# Patient Record
Sex: MALE | Race: WHITE | NOT HISPANIC OR LATINO | Employment: FULL TIME | URBAN - METROPOLITAN AREA
[De-identification: names, ages, dates, MRNs, and addresses within clinical notes are randomized per-mention and may not be internally consistent; named-entity substitution may affect disease eponyms.]

---

## 2022-08-22 ENCOUNTER — OFFICE VISIT (OUTPATIENT)
Dept: FAMILY MEDICINE CLINIC | Facility: CLINIC | Age: 52
End: 2022-08-22

## 2022-08-22 VITALS
DIASTOLIC BLOOD PRESSURE: 86 MMHG | SYSTOLIC BLOOD PRESSURE: 122 MMHG | HEIGHT: 68 IN | WEIGHT: 179 LBS | HEART RATE: 77 BPM | BODY MASS INDEX: 27.13 KG/M2 | OXYGEN SATURATION: 97 %

## 2022-08-22 DIAGNOSIS — Z11.59 NEED FOR HEPATITIS C SCREENING TEST: ICD-10-CM

## 2022-08-22 DIAGNOSIS — Z23 NEED FOR VACCINATION: ICD-10-CM

## 2022-08-22 DIAGNOSIS — Z13.220 SCREENING FOR HYPERLIPIDEMIA: ICD-10-CM

## 2022-08-22 DIAGNOSIS — Z00.00 ANNUAL PHYSICAL EXAM: Primary | ICD-10-CM

## 2022-08-22 DIAGNOSIS — Z12.11 SCREEN FOR COLON CANCER: ICD-10-CM

## 2022-08-22 DIAGNOSIS — Z13.228 ENCOUNTER FOR SCREENING FOR OTHER METABOLIC DISORDERS: ICD-10-CM

## 2022-08-22 PROBLEM — E66.3 OVERWEIGHT WITH BODY MASS INDEX (BMI) OF 27 TO 27.9 IN ADULT: Status: ACTIVE | Noted: 2021-06-09

## 2022-08-22 PROCEDURE — 99386 PREV VISIT NEW AGE 40-64: CPT | Performed by: NURSE PRACTITIONER

## 2022-08-22 PROCEDURE — 0051A COVID-19 (PFIZER) 12+ YRS: CPT | Performed by: NURSE PRACTITIONER

## 2022-08-22 PROCEDURE — 91305 COVID-19 (PFIZER) 12+ YRS: CPT | Performed by: NURSE PRACTITIONER

## 2022-08-22 PROCEDURE — 90750 HZV VACC RECOMBINANT IM: CPT | Performed by: NURSE PRACTITIONER

## 2022-08-22 PROCEDURE — 90471 IMMUNIZATION ADMIN: CPT | Performed by: NURSE PRACTITIONER

## 2022-08-22 NOTE — PROGRESS NOTES
Preventive Exam    History of Present Illness: Prashant Bunch is a 52 y.o. here for check up and review of routine health maintenance. he states he is doing well and has no concerns. He is new to the office and new to me.     Past medical history, surgical history and family history have been reviewed.     Review of Systems    PHYSICAL EXAM    Vitals:    08/22/22 0854   BP: 122/86   Pulse: 77   SpO2: 97%   Body mass index is 27.22 kg/m².      Physical Exam  Vitals and nursing note reviewed.   Constitutional:       Appearance: Normal appearance. He is well-developed and normal weight.   HENT:      Head: Normocephalic and atraumatic.      Right Ear: Tympanic membrane, ear canal and external ear normal.      Left Ear: Tympanic membrane, ear canal and external ear normal.      Nose: Nose normal.      Mouth/Throat:      Lips: Pink.      Mouth: Mucous membranes are moist.      Tongue: No lesions.      Palate: No mass and lesions.      Pharynx: Oropharynx is clear. Uvula midline.      Tonsils: No tonsillar exudate.   Eyes:      Conjunctiva/sclera: Conjunctivae normal.      Pupils: Pupils are equal, round, and reactive to light.   Neck:      Thyroid: No thyromegaly.   Cardiovascular:      Rate and Rhythm: Normal rate and regular rhythm.      Pulses: Normal pulses.           Dorsalis pedis pulses are 2+ on the right side and 2+ on the left side.        Posterior tibial pulses are 2+ on the right side and 2+ on the left side.      Heart sounds: Normal heart sounds. No murmur heard.  Pulmonary:      Effort: Pulmonary effort is normal.      Breath sounds: Normal breath sounds.   Chest:   Breasts:      Right: No supraclavicular adenopathy.      Left: No supraclavicular adenopathy.       Abdominal:      General: Bowel sounds are normal. There is no distension.      Palpations: Abdomen is soft.      Tenderness: There is no abdominal tenderness.   Musculoskeletal:         General: No deformity. Normal range of motion.      Cervical  back: Normal range of motion and neck supple.      Right lower leg: No edema.      Left lower leg: No edema.   Lymphadenopathy:      Head:      Right side of head: No submental, submandibular, tonsillar, preauricular, posterior auricular or occipital adenopathy.      Left side of head: No submental, submandibular, tonsillar, preauricular, posterior auricular or occipital adenopathy.      Cervical: No cervical adenopathy.      Right cervical: No superficial, deep or posterior cervical adenopathy.     Left cervical: No superficial, deep or posterior cervical adenopathy.      Upper Body:      Right upper body: No supraclavicular adenopathy.      Left upper body: No supraclavicular adenopathy.   Skin:     General: Skin is warm and dry.      Capillary Refill: Capillary refill takes 2 to 3 seconds.   Neurological:      General: No focal deficit present.      Mental Status: He is alert and oriented to person, place, and time.      Cranial Nerves: Cranial nerves are intact. No cranial nerve deficit.      Sensory: Sensation is intact.      Motor: Motor function is intact.      Coordination: Coordination is intact.      Gait: Gait is intact.   Psychiatric:         Attention and Perception: Attention and perception normal.         Mood and Affect: Mood and affect normal.         Speech: Speech normal.         Behavior: Behavior normal. Behavior is cooperative.         Thought Content: Thought content normal.         Cognition and Memory: Cognition and memory normal.         Judgment: Judgment normal.         Procedures    Diagnoses and all orders for this visit:    1. Annual physical exam (Primary)    2. Screening for hyperlipidemia  -     Lipid Panel With / Chol / HDL Ratio    3. Need for hepatitis C screening test  -     Hepatitis C Antibody    4. Encounter for screening for other metabolic disorders  -     CBC & Differential  -     Comprehensive Metabolic Panel    5. Screen for colon cancer  -     Cologuard - Stool, Per  Rectum; Future    6. Need for vaccination  -     COVID-19 Vaccine (Pfizer) Gray Cap        Problems Addressed this Visit    None     Visit Diagnoses     Annual physical exam    -  Primary    Screening for hyperlipidemia        Relevant Orders    Lipid Panel With / Chol / HDL Ratio    Need for hepatitis C screening test        Relevant Orders    Hepatitis C Antibody    Encounter for screening for other metabolic disorders        Relevant Orders    CBC & Differential    Comprehensive Metabolic Panel    Screen for colon cancer        Relevant Orders    Cologuard - Stool, Per Rectum    Need for vaccination        Relevant Orders    COVID-19 Vaccine (Pfizer) Gray Cap      Diagnoses       Codes Comments    Annual physical exam    -  Primary ICD-10-CM: Z00.00  ICD-9-CM: V70.0     Screening for hyperlipidemia     ICD-10-CM: Z13.220  ICD-9-CM: V77.91     Need for hepatitis C screening test     ICD-10-CM: Z11.59  ICD-9-CM: V73.89     Encounter for screening for other metabolic disorders     ICD-10-CM: Z13.228  ICD-9-CM: V77.99     Screen for colon cancer     ICD-10-CM: Z12.11  ICD-9-CM: V76.51     Need for vaccination     ICD-10-CM: Z23  ICD-9-CM: V05.9         CBC  Lipid panel  CMP  Cologuard  Hepatitis C Screening    Routine health maintenance reviewed and discussed with Prashant Bunch.    Preventative counseling regarding healthy diet and exercise.   Pt reports that he wears a seatbelt regularly.  Return in about 1 year (around 8/22/2023) for Annual, Labs.

## 2022-08-22 NOTE — PATIENT INSTRUCTIONS
I will call you with your lab results.   Please call with any questions or concerns.    Return in about 1 year (around 2023) for Annual, Labs.    Annual Wellness  Personal Prevention Plan of Service     Date of Office Visit:    Encounter Provider:  SHIRA Morton  Place of Service:  [facility]  Patient Name: Prashant Bunch  :  1970    As part of the Annual Wellness portion of your visit today, we are providing you with this personalized preventive plan of services (PPPS). This plan is based upon recommendations of the [place] Preventive Services Task Force (USPSTF) and the Advisory Committee on Immunization Practices (ACIP).    This lists the preventive care services that should be considered, and provides dates of when you are due. Items listed as completed are up-to-date and do not require any further intervention.    Health Maintenance   Topic Date Due    COLORECTAL CANCER SCREENING  Never done    ZOSTER VACCINE (1 of 2) Never done    COVID-19 Vaccine (4 - Booster for Pfizer series) 2022    HEPATITIS C SCREENING  Never done    INFLUENZA VACCINE  10/01/2022    ANNUAL PHYSICAL  2023    TDAP/TD VACCINES (2 - Td or Tdap) 10/15/2030    Pneumococcal Vaccine 0-64  Aged Out       Orders Placed This Encounter   Procedures    COVID-19 Vaccine (Pfizer) Gray Cap    Cologuard - Stool, Per Rectum     Standing Status:   Future     Standing Expiration Date:   2023     Order Specific Question:   Release to patient     Answer:   Routine Release    Comprehensive Metabolic Panel     Order Specific Question:   Release to patient     Answer:   Routine Release    Lipid Panel With / Chol / HDL Ratio     Order Specific Question:   Release to patient     Answer:   Routine Release    Hepatitis C Antibody     Order Specific Question:   Release to patient     Answer:   Routine Release    CBC & Differential       Return in about 1 year (around 2023) for Annual,  Labs.

## 2022-08-23 LAB
ALBUMIN SERPL-MCNC: 4.8 G/DL (ref 4–5)
ALBUMIN/GLOB SERPL: 2.3 {RATIO} (ref 1.2–2.2)
ALP SERPL-CCNC: 63 IU/L (ref 44–121)
ALT SERPL-CCNC: 18 IU/L (ref 0–44)
AST SERPL-CCNC: 20 IU/L (ref 0–40)
BASOPHILS # BLD AUTO: 0 X10E3/UL (ref 0–0.2)
BASOPHILS NFR BLD AUTO: 1 %
BILIRUB SERPL-MCNC: 0.4 MG/DL (ref 0–1.2)
BUN SERPL-MCNC: 12 MG/DL (ref 6–24)
BUN/CREAT SERPL: 12 (ref 9–20)
CALCIUM SERPL-MCNC: 9.5 MG/DL (ref 8.7–10.2)
CHLORIDE SERPL-SCNC: 105 MMOL/L (ref 96–106)
CHOLEST SERPL-MCNC: 141 MG/DL (ref 100–199)
CHOLEST/HDLC SERPL: 1.9 RATIO (ref 0–5)
CO2 SERPL-SCNC: 23 MMOL/L (ref 20–29)
CREAT SERPL-MCNC: 0.99 MG/DL (ref 0.76–1.27)
EGFRCR-CYS SERPLBLD CKD-EPI 2021: 93 ML/MIN/1.73
EOSINOPHIL # BLD AUTO: 0.1 X10E3/UL (ref 0–0.4)
EOSINOPHIL NFR BLD AUTO: 3 %
ERYTHROCYTE [DISTWIDTH] IN BLOOD BY AUTOMATED COUNT: 11.8 % (ref 11.6–15.4)
GLOBULIN SER CALC-MCNC: 2.1 G/DL (ref 1.5–4.5)
GLUCOSE SERPL-MCNC: 96 MG/DL (ref 65–99)
HCT VFR BLD AUTO: 45 % (ref 37.5–51)
HCV AB S/CO SERPL IA: <0.1 S/CO RATIO (ref 0–0.9)
HDLC SERPL-MCNC: 73 MG/DL
HGB BLD-MCNC: 15.3 G/DL (ref 13–17.7)
IMM GRANULOCYTES # BLD AUTO: 0 X10E3/UL (ref 0–0.1)
IMM GRANULOCYTES NFR BLD AUTO: 0 %
LDLC SERPL CALC-MCNC: 55 MG/DL (ref 0–99)
LYMPHOCYTES # BLD AUTO: 1 X10E3/UL (ref 0.7–3.1)
LYMPHOCYTES NFR BLD AUTO: 21 %
MCH RBC QN AUTO: 32.6 PG (ref 26.6–33)
MCHC RBC AUTO-ENTMCNC: 34 G/DL (ref 31.5–35.7)
MCV RBC AUTO: 96 FL (ref 79–97)
MONOCYTES # BLD AUTO: 0.5 X10E3/UL (ref 0.1–0.9)
MONOCYTES NFR BLD AUTO: 9 %
NEUTROPHILS # BLD AUTO: 3.2 X10E3/UL (ref 1.4–7)
NEUTROPHILS NFR BLD AUTO: 66 %
PLATELET # BLD AUTO: 188 X10E3/UL (ref 150–450)
POTASSIUM SERPL-SCNC: 4.3 MMOL/L (ref 3.5–5.2)
PROT SERPL-MCNC: 6.9 G/DL (ref 6–8.5)
RBC # BLD AUTO: 4.7 X10E6/UL (ref 4.14–5.8)
SODIUM SERPL-SCNC: 142 MMOL/L (ref 134–144)
TRIGL SERPL-MCNC: 63 MG/DL (ref 0–149)
VLDLC SERPL CALC-MCNC: 13 MG/DL (ref 5–40)
WBC # BLD AUTO: 4.9 X10E3/UL (ref 3.4–10.8)

## 2022-08-26 ENCOUNTER — PATIENT ROUNDING (BHMG ONLY) (OUTPATIENT)
Dept: FAMILY MEDICINE CLINIC | Facility: CLINIC | Age: 52
End: 2022-08-26

## 2022-08-26 NOTE — PROGRESS NOTES
A [employer] message has been sent to the patient for PATIENT ROUNDING with [facility].

## 2022-09-26 DIAGNOSIS — R19.5 POSITIVE COLORECTAL CANCER SCREENING USING COLOGUARD TEST: Primary | ICD-10-CM

## 2022-09-28 DIAGNOSIS — R19.5 POSITIVE COLORECTAL CANCER SCREENING USING COLOGUARD TEST: Primary | ICD-10-CM

## 2022-10-27 VITALS
OXYGEN SATURATION: 98 % | SYSTOLIC BLOOD PRESSURE: 101 MMHG | SYSTOLIC BLOOD PRESSURE: 104 MMHG | DIASTOLIC BLOOD PRESSURE: 74 MMHG | DIASTOLIC BLOOD PRESSURE: 73 MMHG | HEART RATE: 75 BPM | OXYGEN SATURATION: 100 % | SYSTOLIC BLOOD PRESSURE: 121 MMHG | RESPIRATION RATE: 18 BRPM | DIASTOLIC BLOOD PRESSURE: 48 MMHG | HEART RATE: 65 BPM | TEMPERATURE: 98 F | RESPIRATION RATE: 17 BRPM | SYSTOLIC BLOOD PRESSURE: 115 MMHG | RESPIRATION RATE: 11 BRPM | HEART RATE: 71 BPM | DIASTOLIC BLOOD PRESSURE: 89 MMHG | DIASTOLIC BLOOD PRESSURE: 87 MMHG | RESPIRATION RATE: 20 BRPM | TEMPERATURE: 98.2 F | SYSTOLIC BLOOD PRESSURE: 125 MMHG | SYSTOLIC BLOOD PRESSURE: 102 MMHG | HEART RATE: 83 BPM | SYSTOLIC BLOOD PRESSURE: 99 MMHG | OXYGEN SATURATION: 97 % | HEART RATE: 66 BPM | RESPIRATION RATE: 8 BRPM | DIASTOLIC BLOOD PRESSURE: 72 MMHG | DIASTOLIC BLOOD PRESSURE: 82 MMHG | RESPIRATION RATE: 13 BRPM | SYSTOLIC BLOOD PRESSURE: 135 MMHG | RESPIRATION RATE: 16 BRPM | OXYGEN SATURATION: 99 % | DIASTOLIC BLOOD PRESSURE: 66 MMHG | HEART RATE: 68 BPM | SYSTOLIC BLOOD PRESSURE: 117 MMHG | RESPIRATION RATE: 21 BRPM | SYSTOLIC BLOOD PRESSURE: 140 MMHG | OXYGEN SATURATION: 94 % | SYSTOLIC BLOOD PRESSURE: 107 MMHG | OXYGEN SATURATION: 95 % | DIASTOLIC BLOOD PRESSURE: 65 MMHG | HEIGHT: 68 IN | HEART RATE: 63 BPM | DIASTOLIC BLOOD PRESSURE: 64 MMHG | OXYGEN SATURATION: 96 % | DIASTOLIC BLOOD PRESSURE: 70 MMHG | HEART RATE: 70 BPM | SYSTOLIC BLOOD PRESSURE: 113 MMHG | WEIGHT: 175 LBS

## 2022-10-31 ENCOUNTER — AMBULATORY SURGICAL CENTER (AMBULATORY)
Dept: URBAN - METROPOLITAN AREA SURGERY 17 | Facility: SURGERY | Age: 52
End: 2022-10-31
Payer: COMMERCIAL

## 2022-10-31 ENCOUNTER — OFFICE (AMBULATORY)
Dept: URBAN - METROPOLITAN AREA PATHOLOGY 4 | Facility: PATHOLOGY | Age: 52
End: 2022-10-31

## 2022-10-31 DIAGNOSIS — D12.8 BENIGN NEOPLASM OF RECTUM: ICD-10-CM

## 2022-10-31 DIAGNOSIS — K63.5 POLYP OF COLON: ICD-10-CM

## 2022-10-31 DIAGNOSIS — D12.4 BENIGN NEOPLASM OF DESCENDING COLON: ICD-10-CM

## 2022-10-31 DIAGNOSIS — K62.1 RECTAL POLYP: ICD-10-CM

## 2022-10-31 DIAGNOSIS — D12.2 BENIGN NEOPLASM OF ASCENDING COLON: ICD-10-CM

## 2022-10-31 DIAGNOSIS — D12.3 BENIGN NEOPLASM OF TRANSVERSE COLON: ICD-10-CM

## 2022-10-31 DIAGNOSIS — R19.5 OTHER FECAL ABNORMALITIES: ICD-10-CM

## 2022-10-31 DIAGNOSIS — Z12.11 ENCOUNTER FOR SCREENING FOR MALIGNANT NEOPLASM OF COLON: ICD-10-CM

## 2022-10-31 LAB
GI HISTOLOGY: A. UNSPECIFIED: (no result)
GI HISTOLOGY: B. UNSPECIFIED: (no result)
GI HISTOLOGY: C. UNSPECIFIED: (no result)
GI HISTOLOGY: D. UNSPECIFIED: (no result)
GI HISTOLOGY: PDF REPORT: (no result)

## 2022-10-31 PROCEDURE — 88305 TISSUE EXAM BY PATHOLOGIST: CPT | Performed by: INTERNAL MEDICINE

## 2022-10-31 PROCEDURE — 45385 COLONOSCOPY W/LESION REMOVAL: CPT | Mod: 33 | Performed by: INTERNAL MEDICINE

## 2022-11-16 ENCOUNTER — PRE-PROCEDURE SCREENING (OUTPATIENT)
Dept: GASTROENTEROLOGY | Facility: CLINIC | Age: 52
End: 2022-11-16

## 2022-11-16 NOTE — TELEPHONE ENCOUNTER
POSITIVE COLOGUARD      Colonoscopy screening--No personal history of polyps--No family history of polyps--Family history  colon cancer--No blood thinners--Medications:              None

## 2022-11-18 ENCOUNTER — PREP FOR SURGERY (OUTPATIENT)
Dept: OTHER | Facility: HOSPITAL | Age: 52
End: 2022-11-18

## 2022-11-18 DIAGNOSIS — R19.5 POSITIVE COLORECTAL CANCER SCREENING USING COLOGUARD TEST: Primary | ICD-10-CM

## 2022-11-18 RX ORDER — SODIUM CHLORIDE, SODIUM LACTATE, POTASSIUM CHLORIDE, CALCIUM CHLORIDE 600; 310; 30; 20 MG/100ML; MG/100ML; MG/100ML; MG/100ML
30 INJECTION, SOLUTION INTRAVENOUS CONTINUOUS
Status: CANCELLED | OUTPATIENT
Start: 2022-11-18

## 2022-12-09 ENCOUNTER — CLINICAL SUPPORT (OUTPATIENT)
Dept: FAMILY MEDICINE CLINIC | Facility: CLINIC | Age: 52
End: 2022-12-09

## 2022-12-09 DIAGNOSIS — Z23 NEED FOR VACCINATION: Primary | ICD-10-CM

## 2022-12-09 PROCEDURE — 91312 COVID-19 (PFIZER) BIVALENT BOOSTER 12+YRS: CPT | Performed by: NURSE PRACTITIONER

## 2022-12-09 PROCEDURE — 90686 IIV4 VACC NO PRSV 0.5 ML IM: CPT | Performed by: NURSE PRACTITIONER

## 2022-12-09 PROCEDURE — 0124A PR ADM SARSCOV2 30MCG/0.3ML BST: CPT | Performed by: NURSE PRACTITIONER

## 2022-12-09 PROCEDURE — 90471 IMMUNIZATION ADMIN: CPT | Performed by: NURSE PRACTITIONER

## 2022-12-12 ENCOUNTER — TELEPHONE (OUTPATIENT)
Dept: GASTROENTEROLOGY | Facility: CLINIC | Age: 52
End: 2022-12-12

## 2022-12-16 ENCOUNTER — CLINICAL SUPPORT (OUTPATIENT)
Dept: FAMILY MEDICINE CLINIC | Facility: CLINIC | Age: 52
End: 2022-12-16

## 2022-12-16 DIAGNOSIS — Z23 NEED FOR VACCINATION: Primary | ICD-10-CM

## 2022-12-16 PROCEDURE — 90750 HZV VACC RECOMBINANT IM: CPT | Performed by: NURSE PRACTITIONER

## 2023-08-28 ENCOUNTER — OFFICE VISIT (OUTPATIENT)
Dept: FAMILY MEDICINE CLINIC | Facility: CLINIC | Age: 53
End: 2023-08-28
Payer: COMMERCIAL

## 2023-08-28 VITALS
HEIGHT: 68 IN | SYSTOLIC BLOOD PRESSURE: 138 MMHG | HEART RATE: 66 BPM | BODY MASS INDEX: 27.28 KG/M2 | RESPIRATION RATE: 18 BRPM | WEIGHT: 180 LBS | DIASTOLIC BLOOD PRESSURE: 90 MMHG | OXYGEN SATURATION: 99 %

## 2023-08-28 DIAGNOSIS — Z13.228 ENCOUNTER FOR SCREENING FOR OTHER METABOLIC DISORDERS: ICD-10-CM

## 2023-08-28 DIAGNOSIS — Z00.00 ANNUAL PHYSICAL EXAM: Primary | ICD-10-CM

## 2023-08-28 DIAGNOSIS — Z12.5 SCREENING PSA (PROSTATE SPECIFIC ANTIGEN): ICD-10-CM

## 2023-08-28 DIAGNOSIS — Z13.220 SCREENING FOR HYPERLIPIDEMIA: ICD-10-CM

## 2023-08-28 LAB
ALBUMIN SERPL-MCNC: 4.8 G/DL (ref 3.5–5.2)
ALBUMIN/GLOB SERPL: 2.3 G/DL
ALP SERPL-CCNC: 63 U/L (ref 39–117)
ALT SERPL-CCNC: 19 U/L (ref 1–41)
AST SERPL-CCNC: 22 U/L (ref 1–40)
BILIRUB SERPL-MCNC: 0.7 MG/DL (ref 0–1.2)
BUN SERPL-MCNC: 12 MG/DL (ref 6–20)
BUN/CREAT SERPL: 11.4 (ref 7–25)
CALCIUM SERPL-MCNC: 9.3 MG/DL (ref 8.6–10.5)
CHLORIDE SERPL-SCNC: 104 MMOL/L (ref 98–107)
CHOLEST SERPL-MCNC: 145 MG/DL (ref 0–200)
CHOLEST/HDLC SERPL: 1.96 {RATIO}
CO2 SERPL-SCNC: 26.4 MMOL/L (ref 22–29)
CREAT SERPL-MCNC: 1.05 MG/DL (ref 0.76–1.27)
EGFRCR SERPLBLD CKD-EPI 2021: 85.9 ML/MIN/1.73
GLOBULIN SER CALC-MCNC: 2.1 GM/DL
GLUCOSE SERPL-MCNC: 94 MG/DL (ref 65–99)
HDLC SERPL-MCNC: 74 MG/DL (ref 40–60)
LDLC SERPL CALC-MCNC: 50 MG/DL (ref 0–100)
POTASSIUM SERPL-SCNC: 4.3 MMOL/L (ref 3.5–5.2)
PROT SERPL-MCNC: 6.9 G/DL (ref 6–8.5)
PSA SERPL-MCNC: 3.18 NG/ML (ref 0–4)
SODIUM SERPL-SCNC: 142 MMOL/L (ref 136–145)
TRIGL SERPL-MCNC: 121 MG/DL (ref 0–150)
VLDLC SERPL CALC-MCNC: 21 MG/DL (ref 5–40)

## 2023-08-28 PROCEDURE — 99396 PREV VISIT EST AGE 40-64: CPT | Performed by: NURSE PRACTITIONER

## 2023-08-28 RX ORDER — MULTIVIT WITH MINERALS/LUTEIN
250 TABLET ORAL DAILY
COMMUNITY

## 2023-08-28 RX ORDER — MULTIPLE VITAMINS W/ MINERALS TAB 9MG-400MCG
1 TAB ORAL DAILY
COMMUNITY

## 2023-08-28 NOTE — PATIENT INSTRUCTIONS
I will call call or send [employer] message with your lab results.   Please call with any questions or concerns.    Return in about 1 year (around 8/28/2024) for Annual, Labs.

## 2023-08-28 NOTE — PROGRESS NOTES
Preventive Exam    History of Present Illness: Prashant Bunch is a 53 y.o. here for check up and review of routine health maintenance. he states he is doing well and has no concerns.    Past medical history, surgical history and family history have been reviewed.     Review of Systems   Constitutional:  Negative for appetite change, chills, fatigue, fever, unexpected weight gain and unexpected weight loss.   HENT:  Positive for congestion (with allergies). Negative for dental problem, facial swelling, mouth sores, nosebleeds, sinus pressure, sore throat, trouble swallowing and voice change.    Eyes: Negative.  Negative for blurred vision, double vision, photophobia and visual disturbance.        Eye exam is up to date.    Respiratory:  Negative for cough, chest tightness, shortness of breath and wheezing.    Cardiovascular:  Negative for chest pain, palpitations and leg swelling.   Gastrointestinal:  Negative for abdominal pain, constipation, diarrhea, nausea and vomiting.   Endocrine: Negative.  Negative for cold intolerance and heat intolerance.   Genitourinary: Negative.  Negative for decreased libido, difficulty urinating, discharge, frequency, genital sores, penile swelling, scrotal swelling and urinary incontinence.   Musculoskeletal:  Negative for arthralgias, back pain, joint swelling and myalgias.   Skin: Negative.  Negative for dry skin.   Allergic/Immunologic: Negative.  Negative for environmental allergies and food allergies.   Neurological:  Negative for dizziness, speech difficulty, weakness, numbness and headache.   Hematological: Negative.  Does not bruise/bleed easily.   Psychiatric/Behavioral: Negative.  Negative for dysphoric mood, sleep disturbance, suicidal ideas and depressed mood. The patient is not nervous/anxious.      PHYSICAL EXAM    Vitals:    08/28/23 0948   BP: 138/90   Pulse: 66   Resp: 18   SpO2: 99%       Body mass index is 27.37 kg/mý.   BMI is >= 25 and <30. (Overweight) The  following options were offered after discussion;: exercise counseling/recommendations and nutrition counseling/recommendations       Physical Exam  Vitals and nursing note reviewed.   Constitutional:       Appearance: Normal appearance. He is well-developed and normal weight.   HENT:      Head: Normocephalic and atraumatic.      Right Ear: Tympanic membrane, ear canal and external ear normal.      Left Ear: Tympanic membrane, ear canal and external ear normal.      Nose: Nose normal.      Mouth/Throat:      Lips: Pink.      Mouth: Mucous membranes are moist.      Tongue: No lesions.      Palate: No mass and lesions.      Pharynx: Oropharynx is clear. Uvula midline.      Tonsils: No tonsillar exudate.   Eyes:      Conjunctiva/sclera: Conjunctivae normal.      Pupils: Pupils are equal, round, and reactive to light.   Neck:      Thyroid: No thyromegaly.   Cardiovascular:      Rate and Rhythm: Normal rate and regular rhythm.      Pulses: Normal pulses.           Dorsalis pedis pulses are 2+ on the right side and 2+ on the left side.        Posterior tibial pulses are 2+ on the right side and 2+ on the left side.      Heart sounds: Normal heart sounds. No murmur heard.  Pulmonary:      Effort: Pulmonary effort is normal.      Breath sounds: Normal breath sounds.   Abdominal:      General: Bowel sounds are normal. There is no distension.      Palpations: Abdomen is soft.      Tenderness: There is no abdominal tenderness.   Musculoskeletal:         General: No deformity. Normal range of motion.      Cervical back: Normal range of motion and neck supple.      Right lower leg: No edema.      Left lower leg: No edema.   Lymphadenopathy:      Head:      Right side of head: No submental, submandibular, tonsillar, preauricular, posterior auricular or occipital adenopathy.      Left side of head: No submental, submandibular, tonsillar, preauricular, posterior auricular or occipital adenopathy.      Cervical: No cervical  adenopathy.      Right cervical: No superficial, deep or posterior cervical adenopathy.     Left cervical: No superficial, deep or posterior cervical adenopathy.      Upper Body:      Right upper body: No supraclavicular adenopathy.      Left upper body: No supraclavicular adenopathy.   Skin:     General: Skin is warm and dry.      Capillary Refill: Capillary refill takes 2 to 3 seconds.   Neurological:      General: No focal deficit present.      Mental Status: He is alert and oriented to person, place, and time.      Cranial Nerves: No cranial nerve deficit.      Sensory: Sensation is intact.      Motor: Motor function is intact.      Coordination: Coordination is intact.      Gait: Gait is intact.   Psychiatric:         Attention and Perception: Attention and perception normal.         Mood and Affect: Mood and affect normal.         Speech: Speech normal.         Behavior: Behavior normal. Behavior is cooperative.         Thought Content: Thought content normal.         Cognition and Memory: Cognition and memory normal.         Judgment: Judgment normal.       Procedures    Diagnoses and all orders for this visit:    1. Annual physical exam (Primary)    2. Screening for hyperlipidemia  -     Lipid Panel With / Chol / HDL Ratio    3. Encounter for screening for other metabolic disorders  -     CBC & Differential  -     Comprehensive Metabolic Panel    4. Screening PSA (prostate specific antigen)  -     PSA Screen        Problems Addressed this Visit    None  Visit Diagnoses       Annual physical exam    -  Primary    Screening for hyperlipidemia        Relevant Orders    Lipid Panel With / Chol / HDL Ratio    Encounter for screening for other metabolic disorders        Relevant Orders    CBC & Differential    Comprehensive Metabolic Panel    Screening PSA (prostate specific antigen)        Relevant Orders    PSA Screen          Diagnoses         Codes Comments    Annual physical exam    -  Primary ICD-10-CM:  Z00.00  ICD-9-CM: V70.0     Screening for hyperlipidemia     ICD-10-CM: Z13.220  ICD-9-CM: V77.91     Encounter for screening for other metabolic disorders     ICD-10-CM: Z13.228  ICD-9-CM: V77.99     Screening PSA (prostate specific antigen)     ICD-10-CM: Z12.5  ICD-9-CM: V76.44           Lipid panel  CBC  CMP  PSA Screening  Routine health maintenance reviewed and discussed with Prashant Bunch.    Preventative counseling regarding healthy diet and exercise.   Pt reports that he wears a seatbelt regularly.   Return in about 1 year (around 8/28/2024) for Annual, Labs.

## 2023-08-29 LAB
BASOPHILS # BLD AUTO: 0.03 10*3/MM3 (ref 0–0.2)
BASOPHILS NFR BLD AUTO: 0.6 % (ref 0–1.5)
EOSINOPHIL # BLD AUTO: 0.1 10*3/MM3 (ref 0–0.4)
EOSINOPHIL NFR BLD AUTO: 2.1 % (ref 0.3–6.2)
ERYTHROCYTE [DISTWIDTH] IN BLOOD BY AUTOMATED COUNT: 11.8 % (ref 12.3–15.4)
HCT VFR BLD AUTO: 44.3 % (ref 37.5–51)
HGB BLD-MCNC: 15.6 G/DL (ref 13–17.7)
IMM GRANULOCYTES # BLD AUTO: 0.01 10*3/MM3 (ref 0–0.05)
IMM GRANULOCYTES NFR BLD AUTO: 0.2 % (ref 0–0.5)
LYMPHOCYTES # BLD AUTO: 0.98 10*3/MM3 (ref 0.7–3.1)
LYMPHOCYTES NFR BLD AUTO: 20.2 % (ref 19.6–45.3)
MCH RBC QN AUTO: 33.5 PG (ref 26.6–33)
MCHC RBC AUTO-ENTMCNC: 35.2 G/DL (ref 31.5–35.7)
MCV RBC AUTO: 95.1 FL (ref 79–97)
MONOCYTES # BLD AUTO: 0.43 10*3/MM3 (ref 0.1–0.9)
MONOCYTES NFR BLD AUTO: 8.9 % (ref 5–12)
NEUTROPHILS # BLD AUTO: 3.29 10*3/MM3 (ref 1.7–7)
NEUTROPHILS NFR BLD AUTO: 68 % (ref 42.7–76)
NRBC BLD AUTO-RTO: 0 /100 WBC (ref 0–0.2)
PLATELET # BLD AUTO: 194 10*3/MM3 (ref 140–450)
RBC # BLD AUTO: 4.66 10*6/MM3 (ref 4.14–5.8)
WBC # BLD AUTO: 4.84 10*3/MM3 (ref 3.4–10.8)

## 2024-08-29 ENCOUNTER — OFFICE VISIT (OUTPATIENT)
Dept: FAMILY MEDICINE CLINIC | Facility: CLINIC | Age: 54
End: 2024-08-29
Payer: COMMERCIAL

## 2024-08-29 VITALS
SYSTOLIC BLOOD PRESSURE: 108 MMHG | HEIGHT: 68 IN | OXYGEN SATURATION: 98 % | WEIGHT: 171 LBS | DIASTOLIC BLOOD PRESSURE: 80 MMHG | HEART RATE: 84 BPM | RESPIRATION RATE: 18 BRPM | BODY MASS INDEX: 25.91 KG/M2

## 2024-08-29 DIAGNOSIS — Z00.00 ANNUAL PHYSICAL EXAM: Primary | ICD-10-CM

## 2024-08-29 DIAGNOSIS — Z13.228 ENCOUNTER FOR SCREENING FOR OTHER METABOLIC DISORDERS: ICD-10-CM

## 2024-08-29 DIAGNOSIS — Z12.5 SCREENING PSA (PROSTATE SPECIFIC ANTIGEN): ICD-10-CM

## 2024-08-29 DIAGNOSIS — Z13.220 SCREENING FOR HYPERLIPIDEMIA: ICD-10-CM

## 2024-08-29 PROCEDURE — 99396 PREV VISIT EST AGE 40-64: CPT | Performed by: NURSE PRACTITIONER

## 2024-08-29 NOTE — PROGRESS NOTES
Preventive Exam    History of Present Illness: Prashant Bunch is a 54 y.o. here for check up and review of routine health maintenance. he states he is doing well and has no concerns.    Past medical history, surgical history and family history have been reviewed.     Review of Systems   Constitutional:  Negative for appetite change, chills, fatigue and fever.   HENT:  Positive for congestion. Negative for dental problem, sinus pressure and sore throat.         Dental exam is up to date.    Eyes: Negative.  Negative for blurred vision, double vision, photophobia and visual disturbance.        Wears glasses. Eye exam is up to date.    Respiratory:  Negative for cough, chest tightness and shortness of breath.    Cardiovascular:  Negative for chest pain, palpitations and leg swelling.   Gastrointestinal:  Negative for abdominal pain, constipation, diarrhea, nausea and vomiting.   Endocrine: Negative.  Negative for cold intolerance and heat intolerance.   Genitourinary: Negative.  Negative for decreased libido, decreased urine volume, difficulty urinating, flank pain, genital sores, hematuria, erectile dysfunction and testicular pain.   Musculoskeletal:  Negative for arthralgias, back pain, joint swelling and myalgias.   Skin: Negative.  Negative for pallor.   Allergic/Immunologic: Negative.  Negative for environmental allergies and food allergies.   Neurological:  Negative for dizziness, weakness, numbness and headache.   Hematological: Negative.  Does not bruise/bleed easily.   Psychiatric/Behavioral: Negative.  Negative for hallucinations, suicidal ideas and depressed mood. The patient is not nervous/anxious.        PHYSICAL EXAM    Vitals:    08/29/24 1542   BP: 108/80   Pulse: 84   Resp: 18   SpO2: 98%       Body mass index is 26 kg/m².   BMI is >= 25 and <30. (Overweight) The following options were offered after discussion;: exercise counseling/recommendations and nutrition counseling/recommendations       Physical  Exam  Vitals and nursing note reviewed.   Constitutional:       Appearance: Normal appearance. He is well-developed.   HENT:      Head: Normocephalic and atraumatic.      Right Ear: Tympanic membrane, ear canal and external ear normal.      Left Ear: Tympanic membrane, ear canal and external ear normal.      Nose: Nose normal.      Mouth/Throat:      Lips: Pink.      Mouth: Mucous membranes are moist.      Tongue: No lesions.      Palate: No mass and lesions.      Pharynx: Oropharynx is clear. Uvula midline.      Tonsils: No tonsillar exudate.   Eyes:      Conjunctiva/sclera: Conjunctivae normal.      Pupils: Pupils are equal, round, and reactive to light.   Neck:      Thyroid: No thyromegaly.   Cardiovascular:      Rate and Rhythm: Normal rate and regular rhythm.      Pulses: Normal pulses.           Dorsalis pedis pulses are 2+ on the right side and 2+ on the left side.        Posterior tibial pulses are 2+ on the right side and 2+ on the left side.      Heart sounds: Normal heart sounds. No murmur heard.  Pulmonary:      Effort: Pulmonary effort is normal.      Breath sounds: Normal breath sounds.   Abdominal:      General: Bowel sounds are normal. There is no distension.      Palpations: Abdomen is soft.      Tenderness: There is no abdominal tenderness.   Musculoskeletal:         General: No deformity. Normal range of motion.      Cervical back: Normal range of motion and neck supple.      Right lower leg: No edema.      Left lower leg: No edema.   Lymphadenopathy:      Head:      Right side of head: No submental, submandibular, tonsillar, preauricular, posterior auricular or occipital adenopathy.      Left side of head: No submental, submandibular, tonsillar, preauricular, posterior auricular or occipital adenopathy.      Cervical: No cervical adenopathy.      Right cervical: No superficial, deep or posterior cervical adenopathy.     Left cervical: No superficial, deep or posterior cervical adenopathy.       Upper Body:      Right upper body: No supraclavicular adenopathy.      Left upper body: No supraclavicular adenopathy.   Skin:     General: Skin is warm and dry.      Capillary Refill: Capillary refill takes 2 to 3 seconds.   Neurological:      General: No focal deficit present.      Mental Status: He is alert and oriented to person, place, and time.      Cranial Nerves: No cranial nerve deficit.      Sensory: Sensation is intact.      Motor: Motor function is intact.      Coordination: Coordination is intact.      Gait: Gait is intact.   Psychiatric:         Attention and Perception: Attention and perception normal.         Mood and Affect: Mood and affect normal.         Speech: Speech normal.         Behavior: Behavior normal. Behavior is cooperative.         Thought Content: Thought content normal.         Cognition and Memory: Cognition and memory normal.         Judgment: Judgment normal.         Procedures    Diagnoses and all orders for this visit:    1. Annual physical exam (Primary)    2. Encounter for screening for other metabolic disorders  -     CBC & Differential  -     Comprehensive Metabolic Panel    3. Screening for hyperlipidemia  -     Lipid Panel With / Chol / HDL Ratio    4. Screening PSA (prostate specific antigen)  -     PSA Screen        Problems Addressed this Visit    None  Visit Diagnoses       Annual physical exam    -  Primary    Encounter for screening for other metabolic disorders        Relevant Orders    CBC & Differential    Comprehensive Metabolic Panel    Screening for hyperlipidemia        Relevant Orders    Lipid Panel With / Chol / HDL Ratio    Screening PSA (prostate specific antigen)        Relevant Orders    PSA Screen          Diagnoses         Codes Comments    Annual physical exam    -  Primary ICD-10-CM: Z00.00  ICD-9-CM: V70.0     Encounter for screening for other metabolic disorders     ICD-10-CM: Z13.228  ICD-9-CM: V77.99     Screening for hyperlipidemia      ICD-10-CM: Z13.220  ICD-9-CM: V77.91     Screening PSA (prostate specific antigen)     ICD-10-CM: Z12.5  ICD-9-CM: V76.44             Routine health maintenance reviewed and discussed with Prashant Bunch.      No follow-ups on file.

## 2024-08-30 LAB
ALBUMIN SERPL-MCNC: 4.3 G/DL (ref 3.5–5.2)
ALBUMIN/GLOB SERPL: 1.7 G/DL
ALP SERPL-CCNC: 78 U/L (ref 39–117)
ALT SERPL-CCNC: 14 U/L (ref 1–41)
AST SERPL-CCNC: 18 U/L (ref 1–40)
BASOPHILS # BLD AUTO: 0.03 10*3/MM3 (ref 0–0.2)
BASOPHILS NFR BLD AUTO: 0.6 % (ref 0–1.5)
BILIRUB SERPL-MCNC: 0.3 MG/DL (ref 0–1.2)
BUN SERPL-MCNC: 14 MG/DL (ref 6–20)
BUN/CREAT SERPL: 16.5 (ref 7–25)
CALCIUM SERPL-MCNC: 9.1 MG/DL (ref 8.6–10.5)
CHLORIDE SERPL-SCNC: 108 MMOL/L (ref 98–107)
CHOLEST SERPL-MCNC: 162 MG/DL (ref 0–200)
CHOLEST/HDLC SERPL: 2.35 {RATIO}
CO2 SERPL-SCNC: 23.9 MMOL/L (ref 22–29)
CREAT SERPL-MCNC: 0.85 MG/DL (ref 0.76–1.27)
EGFRCR SERPLBLD CKD-EPI 2021: 104.6 ML/MIN/1.73
EOSINOPHIL # BLD AUTO: 0.12 10*3/MM3 (ref 0–0.4)
EOSINOPHIL NFR BLD AUTO: 2.4 % (ref 0.3–6.2)
ERYTHROCYTE [DISTWIDTH] IN BLOOD BY AUTOMATED COUNT: 11.3 % (ref 12.3–15.4)
GLOBULIN SER CALC-MCNC: 2.5 GM/DL
GLUCOSE SERPL-MCNC: 98 MG/DL (ref 65–99)
HCT VFR BLD AUTO: 42.4 % (ref 37.5–51)
HDLC SERPL-MCNC: 69 MG/DL (ref 40–60)
HGB BLD-MCNC: 14.7 G/DL (ref 13–17.7)
IMM GRANULOCYTES # BLD AUTO: 0.01 10*3/MM3 (ref 0–0.05)
IMM GRANULOCYTES NFR BLD AUTO: 0.2 % (ref 0–0.5)
LDLC SERPL CALC-MCNC: 49 MG/DL (ref 0–100)
LYMPHOCYTES # BLD AUTO: 1.28 10*3/MM3 (ref 0.7–3.1)
LYMPHOCYTES NFR BLD AUTO: 25.5 % (ref 19.6–45.3)
MCH RBC QN AUTO: 33.9 PG (ref 26.6–33)
MCHC RBC AUTO-ENTMCNC: 34.7 G/DL (ref 31.5–35.7)
MCV RBC AUTO: 97.9 FL (ref 79–97)
MONOCYTES # BLD AUTO: 0.41 10*3/MM3 (ref 0.1–0.9)
MONOCYTES NFR BLD AUTO: 8.2 % (ref 5–12)
NEUTROPHILS # BLD AUTO: 3.17 10*3/MM3 (ref 1.7–7)
NEUTROPHILS NFR BLD AUTO: 63.1 % (ref 42.7–76)
NRBC BLD AUTO-RTO: 0 /100 WBC (ref 0–0.2)
PLATELET # BLD AUTO: 203 10*3/MM3 (ref 140–450)
POTASSIUM SERPL-SCNC: 4.2 MMOL/L (ref 3.5–5.2)
PROT SERPL-MCNC: 6.8 G/DL (ref 6–8.5)
PSA SERPL-MCNC: 3.24 NG/ML (ref 0–4)
RBC # BLD AUTO: 4.33 10*6/MM3 (ref 4.14–5.8)
SODIUM SERPL-SCNC: 141 MMOL/L (ref 136–145)
TRIGL SERPL-MCNC: 289 MG/DL (ref 0–150)
VLDLC SERPL CALC-MCNC: 44 MG/DL (ref 5–40)
WBC # BLD AUTO: 5.02 10*3/MM3 (ref 3.4–10.8)

## 2025-08-05 ENCOUNTER — OFFICE VISIT (OUTPATIENT)
Dept: FAMILY MEDICINE CLINIC | Facility: CLINIC | Age: 55
End: 2025-08-05
Payer: COMMERCIAL

## 2025-08-05 VITALS
WEIGHT: 175.9 LBS | DIASTOLIC BLOOD PRESSURE: 80 MMHG | RESPIRATION RATE: 16 BRPM | OXYGEN SATURATION: 98 % | HEART RATE: 80 BPM | SYSTOLIC BLOOD PRESSURE: 138 MMHG | HEIGHT: 68 IN | BODY MASS INDEX: 26.66 KG/M2

## 2025-08-05 DIAGNOSIS — M54.50 CHRONIC RIGHT-SIDED LOW BACK PAIN WITHOUT SCIATICA: Primary | ICD-10-CM

## 2025-08-05 DIAGNOSIS — G89.29 CHRONIC RIGHT-SIDED LOW BACK PAIN WITHOUT SCIATICA: Primary | ICD-10-CM

## 2025-08-05 PROCEDURE — 99212 OFFICE O/P EST SF 10 MIN: CPT | Performed by: NURSE PRACTITIONER
